# Patient Record
(demographics unavailable — no encounter records)

---

## 2024-12-23 NOTE — DISCUSSION/SUMMARY
[FreeTextEntry1] : Instructed to take antibiotic as prescribed. Possible adverse reactions and side effects discussed. Recommend hydration and rest. I also recommend saline nasal drops, and warm compress on face. I do not recommend the use of decongestants, but can use analgesics to help with facial pressure/headache.

## 2024-12-23 NOTE — HISTORY OF PRESENT ILLNESS
[de-identified] : cough [FreeTextEntry6] : 18 year old with cough x 2 months nasal secretions halitosis wet cough day and night

## 2025-07-07 NOTE — HISTORY OF PRESENT ILLNESS
[Up to date] : Up to date [Eats meals with family] : eats meals with family [Has family members/adults to turn to for help] : has family members/adults to turn to for help [Is permitted and is able to make independent decisions] : Is permitted and is able to make independent decisions [Eats regular meals including adequate fruits and vegetables] : eats regular meals including adequate fruits and vegetables [Has friends] : has friends [Drinks alcohol] : drinks alcohol [Exposure to alcohol] : exposure to alcohol [No] : No cigarette smoke exposure [Uses safety belts/safety equipment] : uses safety belts/safety equipment  [Has peer relationships free of violence] : has peer relationships free of violence [Yes] : Patient has had sexual intercourse. [Has ways to cope with stress] : has ways to cope with stress [Displays self-confidence] : displays self-confidence [Gets depressed, anxious, or irritable/has mood swings] : gets depressed, anxious, or irritable/has mood swings [With Teen] : teen [NO] : No [Uses drugs] : uses drugs  [Exposure to drugs] : exposure to drugs [Sleep Concerns] : no sleep concerns [Has concerns about body or appearance] : does not have concerns about body or appearance [Uses electronic nicotine delivery system] : does not use electronic nicotine delivery system [Exposure to electronic nicotine delivery system] : no exposure to electronic nicotine delivery system [Uses tobacco] : does not use tobacco [Exposure to tobacco] : no exposure to tobacco [Impaired/distracted driving] : no impaired/distracted driving [Has problems with sleep] : does not have problems with sleep [Has thought about hurting self or considered suicide] : has not thought about hurting self or considered suicide [FreeTextEntry7] : doing well [de-identified] : [employer] entering sophomore year [de-identified] : social in college;occasional cannabis [de-identified] : practices safe sex [de-identified] : hx of anxiety off meds sees a therapist sporadically

## 2025-07-07 NOTE — DISCUSSION/SUMMARY
[Met privately with the adolescent for part of the office visit?] : Met privately with the adolescent for part of the office visit? Yes [Adolescent demonstrates understanding of his/her conditions and how to take prescribed medications?] : Adolescent demonstrates understanding of his/her conditions and how to take prescribed medications? Yes [Adolescent asks questions during each office  visit and participates in the care plan?] : Adolescent asks questions during each office visit and participates in the care plan? Yes [Adolescent is competent in independently making appointments, filling prescriptions, following up on referrals, and seeking emergency services, as needed?] : Adolescent is competent in independently making appointments, filling prescriptions, following up on referrals, and seeking emergency services, as needed? Yes [Adolescent's caregivers were provided with the opportunity to discuss their concerns about transferring decision making responsibility to the adolescent?] : Adolescent's caregivers were provided with the opportunity to discuss their concerns about transferring decision making responsibility to the adolescent? Yes [Discussed using [facility] to access health records and communicate with the adolescent's care team?] : Discussed using [facility] to access health records and communicate with the adolescent's care team? Yes [Initiated discussion about transfer to an adult healthcare provider?] : Initiated discussion about transfer to an adult healthcare provider? No [FreeTextEntry1] : Continue balanced diet with all food groups. Brush teeth twice a day with toothbrush. Recommend visit to dentist. Maintain consistent daily routines and sleep schedule. Personal hygiene, puberty, and sexual health reviewed. Risky behaviors assessed. School discussed. Limit screen time to no more than 2 hours per day. Encourage physical activity. PHQ-9 reviewed no TB risk Return 1 year for routine well child check.

## 2025-07-07 NOTE — HISTORY OF PRESENT ILLNESS
[Up to date] : Up to date [Eats meals with family] : eats meals with family [Has family members/adults to turn to for help] : has family members/adults to turn to for help [Is permitted and is able to make independent decisions] : Is permitted and is able to make independent decisions [Eats regular meals including adequate fruits and vegetables] : eats regular meals including adequate fruits and vegetables [Has friends] : has friends [Drinks alcohol] : drinks alcohol [Exposure to alcohol] : exposure to alcohol [No] : No cigarette smoke exposure [Uses safety belts/safety equipment] : uses safety belts/safety equipment  [Has peer relationships free of violence] : has peer relationships free of violence [Yes] : Patient has had sexual intercourse. [Has ways to cope with stress] : has ways to cope with stress [Displays self-confidence] : displays self-confidence [Gets depressed, anxious, or irritable/has mood swings] : gets depressed, anxious, or irritable/has mood swings [With Teen] : teen [NO] : No [Uses drugs] : uses drugs  [Exposure to drugs] : exposure to drugs [Sleep Concerns] : no sleep concerns [Has concerns about body or appearance] : does not have concerns about body or appearance [Uses electronic nicotine delivery system] : does not use electronic nicotine delivery system [Exposure to electronic nicotine delivery system] : no exposure to electronic nicotine delivery system [Uses tobacco] : does not use tobacco [Exposure to tobacco] : no exposure to tobacco [Impaired/distracted driving] : no impaired/distracted driving [Has problems with sleep] : does not have problems with sleep [Has thought about hurting self or considered suicide] : has not thought about hurting self or considered suicide [FreeTextEntry7] : doing well [de-identified] : [employer] entering sophomore year [de-identified] : social in college;occasional cannabis [de-identified] : practices safe sex [de-identified] : hx of anxiety off meds sees a therapist sporadically

## 2025-07-07 NOTE — PHYSICAL EXAM
[Alert] : alert [No Acute Distress] : no acute distress [Normocephalic] : normocephalic [EOMI Bilateral] : EOMI bilateral [Clear tympanic membranes with bony landmarks and light reflex present bilaterally] : clear tympanic membranes with bony landmarks and light reflex present bilaterally  [Pink Nasal Mucosa] : pink nasal mucosa [Nonerythematous Oropharynx] : nonerythematous oropharynx [Supple, full passive range of motion] : supple, full passive range of motion [No Palpable Masses] : no palpable masses [Clear to Auscultation Bilaterally] : clear to auscultation bilaterally [Regular Rate and Rhythm] : regular rate and rhythm [Normal S1, S2 audible] : normal S1, S2 audible [No Murmurs] : no murmurs [+2 Femoral Pulses] : +2 femoral pulses [Soft] : soft [NonTender] : non tender [Non Distended] : non distended [Normoactive Bowel Sounds] : normoactive bowel sounds [No Hepatomegaly] : no hepatomegaly [No Splenomegaly] : no splenomegaly [Garry: ____] : Garry [unfilled] [Garry: _____] : Garry [unfilled] [No Abnormal Lymph Nodes Palpated] : no abnormal lymph nodes palpated [Normal Muscle Tone] : normal muscle tone [No Gait Asymmetry] : no gait asymmetry [No pain or deformities with palpation of bone, muscles, joints] : no pain or deformities with palpation of bone, muscles, joints [Straight] : straight [+2 Patella DTR] : +2 patella DTR [Cranial Nerves Grossly Intact] : cranial nerves grossly intact [No Rash or Lesions] : no rash or lesions [Circumcised] : circumcised [Bilateral descended testes] : bilateral descended testes [No Testicular Masses] : no testicular masses